# Patient Record
Sex: FEMALE | Race: WHITE | NOT HISPANIC OR LATINO | Employment: FULL TIME | URBAN - METROPOLITAN AREA
[De-identification: names, ages, dates, MRNs, and addresses within clinical notes are randomized per-mention and may not be internally consistent; named-entity substitution may affect disease eponyms.]

---

## 2024-03-29 ENCOUNTER — OFFICE VISIT (OUTPATIENT)
Dept: FAMILY MEDICINE CLINIC | Facility: CLINIC | Age: 44
End: 2024-03-29
Payer: COMMERCIAL

## 2024-03-29 VITALS
SYSTOLIC BLOOD PRESSURE: 147 MMHG | HEIGHT: 65 IN | TEMPERATURE: 97.2 F | BODY MASS INDEX: 47.32 KG/M2 | HEART RATE: 89 BPM | OXYGEN SATURATION: 98 % | WEIGHT: 284 LBS | DIASTOLIC BLOOD PRESSURE: 89 MMHG

## 2024-03-29 DIAGNOSIS — D50.0 IRON DEFICIENCY ANEMIA DUE TO CHRONIC BLOOD LOSS: Chronic | ICD-10-CM

## 2024-03-29 DIAGNOSIS — Z76.89 ENCOUNTER FOR WEIGHT MANAGEMENT: Primary | ICD-10-CM

## 2024-03-29 DIAGNOSIS — F51.04 PSYCHOPHYSIOLOGICAL INSOMNIA: ICD-10-CM

## 2024-03-29 DIAGNOSIS — R73.03 PREDIABETES: Chronic | ICD-10-CM

## 2024-03-29 DIAGNOSIS — E66.01 CLASS 3 SEVERE OBESITY WITHOUT SERIOUS COMORBIDITY WITH BODY MASS INDEX (BMI) OF 45.0 TO 49.9 IN ADULT, UNSPECIFIED OBESITY TYPE: Chronic | ICD-10-CM

## 2024-03-29 RX ORDER — METFORMIN HYDROCHLORIDE 500 MG/1
500 TABLET, EXTENDED RELEASE ORAL
Qty: 90 TABLET | Refills: 0 | Status: SHIPPED | OUTPATIENT
Start: 2024-03-29

## 2024-03-29 NOTE — PROGRESS NOTES
"Chief Complaint  Weight Loss (2 month follow up )    Subjective        Jailyn IRWIN presents to [facility]  History of Present Illness    History of Present Illness  The patient is a 44-year-old female who is here for follow-up on weight loss.    The 7.5 mg Zepbound is not helping her and she is staying hungry. She has been on the 7.5 mg for a month and would like to go up on the dose. She is not having any trouble with the compound pharmacy. She feels like her weight is slowly dropping. The week before her period, she gets up in the middle of the night to eat. She has been exercising and has lost some weight. She does not check her blood pressure. She has been stressed out for the past week. Her shoulders are aching. She is not consistent with taking her iron. She has noticed that she is starting to bruise easily. She denies any constipation, nausea, or vomiting. When she was on weight loss medication, her mouth gets dry and she gets blisters around her mouth. She has never been on metformin.    She has not been sleeping. She has been having bad night sweats. She gets 3 to 4 hours of sleep. She does not want to be on a sleeping pill. She plays on her phone until she is tired-aware this is not good. She has anxiety.         Objective   Vital Signs:  /89 (BP Location: Left arm, Patient Position: Sitting, Cuff Size: Large Adult)   Pulse 89   Temp 97.2 °F (36.2 °C)   Ht 165.1 cm (65\")   Wt 129 kg (284 lb)   SpO2 98%   BMI 47.26 kg/m²   Estimated body mass index is 47.26 kg/m² as calculated from the following:    Height as of this encounter: 165.1 cm (65\").    Weight as of this encounter: 129 kg (284 lb).               Physical Exam  Vitals and nursing note reviewed.   Constitutional:       General: She is not in acute distress.     Appearance: She is well-developed. She is obese. She is not ill-appearing or diaphoretic.   HENT:      Head: Normocephalic and atraumatic.   Eyes: "      General:         Right eye: No discharge.         Left eye: No discharge.      Conjunctiva/sclera: Conjunctivae normal.   Cardiovascular:      Rate and Rhythm: Normal rate and regular rhythm.   Pulmonary:      Effort: Pulmonary effort is normal.      Breath sounds: Normal breath sounds.   Abdominal:      General: Bowel sounds are normal. There is no distension.      Palpations: Abdomen is soft.   Musculoskeletal:         General: No deformity.      Comments: Gait smooth and steady   Skin:     General: Skin is warm and dry.   Neurological:      General: No focal deficit present.      Mental Status: She is alert and oriented to person, place, and time.   Psychiatric:         Mood and Affect: Mood normal.         Behavior: Behavior normal.         Thought Content: Thought content normal.          Physical Exam       Result Review :            Results                  Assessment and Plan     Diagnoses and all orders for this visit:    1. Encounter for weight management (Primary)    2. Class 3 severe obesity without serious comorbidity with body mass index (BMI) of 45.0 to 49.9 in adult, unspecified obesity type  -     Tirzepatide-Weight Management (ZEPBOUND) 10 MG/0.5ML solution auto-injector; Inject 0.5 mL under the skin into the appropriate area as directed 1 (One) Time Per Week.  Dispense: 3 mL; Refill: 0  -     metFORMIN ER (GLUCOPHAGE-XR) 500 MG 24 hr tablet; Take 1 tablet by mouth Daily With Breakfast.  Dispense: 90 tablet; Refill: 0    3. Prediabetes  -     metFORMIN ER (GLUCOPHAGE-XR) 500 MG 24 hr tablet; Take 1 tablet by mouth Daily With Breakfast.  Dispense: 90 tablet; Refill: 0    4. Iron deficiency anemia due to chronic blood loss    5. Psychophysiological insomnia        Assessment & Plan  1.  Obesity  She has lost 5 pounds since last visit in January. I will increase her Zepbound to 10 mg.  We will add metformin 500 mg once a day to see if this can help with likely insulin resistance which may improve  her fatigue.  May help spark some weight loss.  She will not start new dose of Zepbound at the same time to avoid side effects.  Side effects of metformin and dietary modifications with increased dose of Zepbound discussed.  She has had previous noted prediabetes.    2. Easy bruising.  She was advised to take vitamin C with her iron.  I do not see any unusual bruising.    3. Insomnia  This could be due to anxiety.  She is on Cymbalta currently for fibromyalgia.  We could look at adding on additional medication if needed but at this point I think we should try sleep hygiene which we discussed.    4.  Iron deficiency anemia  She will continue current iron, add vitamin C to help absorption and bruising and will reevaluate next appointment.    Follow-up  She will follow up as needed.            Follow Up     Return in about 3 months (around 6/29/2024).  Patient was given instructions and counseling regarding her condition or for health maintenance advice. Please see specific information pulled into the AVS if appropriate.    Patient or patient representative verbalized consent for the use of Ambient Listening during the visit with  MATEO Condon for chart documentation. 4/5/2024  06:52 EDT

## 2024-06-20 DIAGNOSIS — M79.7 FIBROMYALGIA: Chronic | ICD-10-CM

## 2024-06-20 RX ORDER — DULOXETIN HYDROCHLORIDE 60 MG/1
60 CAPSULE, DELAYED RELEASE ORAL DAILY
Qty: 90 CAPSULE | Refills: 1 | Status: SHIPPED | OUTPATIENT
Start: 2024-06-20

## 2024-06-20 NOTE — TELEPHONE ENCOUNTER
Rx Refill Note  Requested Prescriptions     Pending Prescriptions Disp Refills    DULoxetine (CYMBALTA) 60 MG capsule [Pharmacy Med Name: DULoxetine HCL DR 60 MG CAPSULE] 90 capsule 1     Sig: TAKE 1 CAPSULE BY MOUTH DAILY      Last office visit with prescribing clinician: 3/29/2024   Last telemedicine visit with prescribing clinician: Visit date not found   Next office visit with prescribing clinician: Visit date not found                         Would you like a call back once the refill request has been completed: [] Yes [] No    If the office needs to give you a call back, can they leave a voicemail: [] Yes [] No    Sam Yan Rep  06/20/24, 08:44 EDT

## 2024-06-24 DIAGNOSIS — R73.03 PREDIABETES: Chronic | ICD-10-CM

## 2024-06-24 DIAGNOSIS — E66.01 CLASS 3 SEVERE OBESITY WITHOUT SERIOUS COMORBIDITY WITH BODY MASS INDEX (BMI) OF 45.0 TO 49.9 IN ADULT, UNSPECIFIED OBESITY TYPE: Chronic | ICD-10-CM

## 2024-06-24 RX ORDER — METFORMIN HYDROCHLORIDE 500 MG/1
500 TABLET, EXTENDED RELEASE ORAL
Qty: 90 TABLET | Refills: 1 | Status: SHIPPED | OUTPATIENT
Start: 2024-06-24

## 2024-06-24 NOTE — TELEPHONE ENCOUNTER
Rx Refill Note  Requested Prescriptions     Pending Prescriptions Disp Refills    metFORMIN ER (GLUCOPHAGE-XR) 500 MG 24 hr tablet [Pharmacy Med Name: METFORMIN HCL  MG TABLET] 90 tablet 1     Sig: TAKE 1 TABLET BY MOUTH DAILY WITH BREAKFAST      Last office visit with prescribing clinician: 3/29/2024   Last telemedicine visit with prescribing clinician: Visit date not found   Next office visit with prescribing clinician: Visit date not found                         Would you like a call back once the refill request has been completed: [] Yes [] No    If the office needs to give you a call back, can they leave a voicemail: [] Yes [] No    Sam Yan Rep  06/24/24, 09:08 EDT

## 2024-08-02 ENCOUNTER — OFFICE VISIT (OUTPATIENT)
Dept: FAMILY MEDICINE CLINIC | Facility: CLINIC | Age: 44
End: 2024-08-02
Payer: COMMERCIAL

## 2024-08-02 VITALS
HEART RATE: 64 BPM | WEIGHT: 287.4 LBS | BODY MASS INDEX: 47.88 KG/M2 | SYSTOLIC BLOOD PRESSURE: 125 MMHG | HEIGHT: 65 IN | TEMPERATURE: 97 F | RESPIRATION RATE: 14 BRPM | DIASTOLIC BLOOD PRESSURE: 75 MMHG | OXYGEN SATURATION: 99 %

## 2024-08-02 DIAGNOSIS — E66.01 CLASS 3 SEVERE OBESITY WITHOUT SERIOUS COMORBIDITY WITH BODY MASS INDEX (BMI) OF 45.0 TO 49.9 IN ADULT, UNSPECIFIED OBESITY TYPE: Primary | ICD-10-CM

## 2024-08-02 DIAGNOSIS — Z12.31 ENCOUNTER FOR SCREENING MAMMOGRAM FOR MALIGNANT NEOPLASM OF BREAST: ICD-10-CM

## 2024-08-02 DIAGNOSIS — R73.03 PREDIABETES: ICD-10-CM

## 2024-08-02 PROCEDURE — 99214 OFFICE O/P EST MOD 30 MIN: CPT | Performed by: NURSE PRACTITIONER

## 2024-08-02 RX ORDER — SEMAGLUTIDE 0.5 MG/.5ML
0.5 INJECTION, SOLUTION SUBCUTANEOUS WEEKLY
Qty: 2 ML | Refills: 0 | Status: SHIPPED | OUTPATIENT
Start: 2024-08-02

## 2024-08-02 NOTE — PROGRESS NOTES
"Chief Complaint  Med Management    Subjective        Jailyn IRWIN presents to [facility]  History of Present Illness    History of Present Illness  The patient is a 43-year-old female who presents for a follow-up.    The patient discontinued Zepbound approximately one month ago due to financial constraints, prompting her interest in transitioning to semaglutide. Despite being on Zepbound, she did not observe any weight loss, although it did suppress her appetite. She reported a decrease in weight, with her lowest recorded weight being 275 pounds last month, followed by a weight gain of 4 to 7 pounds. She was previously prescribed Semaglutide and phentermine by another physician for appetite suppression. Her daily protein intake is up to 100 g. Caloric intake has now decreased to 1900 calories. She was also prescribed Topamax, which resulted in weight loss, but resulted in excessive thirst. She reports poor sleep quality, although her snoring has improved. She denies any gastrointestinal symptoms such as nausea, vomiting, diarrhea, or constipation. She also denies any ear or throat issues. She has considered weight loss surgery.       Objective   Vital Signs:  /75   Pulse 64   Temp 97 °F (36.1 °C) (Temporal)   Resp 14   Ht 165.1 cm (65\")   Wt 130 kg (287 lb 6.4 oz)   SpO2 99%   BMI 47.83 kg/m²   Estimated body mass index is 47.83 kg/m² as calculated from the following:    Height as of this encounter: 165.1 cm (65\").    Weight as of this encounter: 130 kg (287 lb 6.4 oz).               Physical Exam  Vitals and nursing note reviewed.   Constitutional:       General: She is not in acute distress.     Appearance: She is well-developed. She is obese. She is not ill-appearing or diaphoretic.   HENT:      Head: Normocephalic and atraumatic.   Eyes:      General:         Right eye: No discharge.         Left eye: No discharge.      Conjunctiva/sclera: Conjunctivae normal. "   Cardiovascular:      Rate and Rhythm: Normal rate and regular rhythm.   Pulmonary:      Effort: Pulmonary effort is normal.      Breath sounds: Normal breath sounds.   Abdominal:      General: Bowel sounds are normal. There is no distension.      Palpations: Abdomen is soft.      Tenderness: There is no abdominal tenderness.   Musculoskeletal:         General: No deformity.      Comments: Gait smooth and steady   Skin:     General: Skin is warm and dry.   Neurological:      General: No focal deficit present.      Mental Status: She is alert and oriented to person, place, and time.   Psychiatric:         Mood and Affect: Mood normal.         Behavior: Behavior normal.          Physical Exam       Result Review :            Results                  Assessment and Plan     Diagnoses and all orders for this visit:    1. Class 3 severe obesity without serious comorbidity with body mass index (BMI) of 45.0 to 49.9 in adult, unspecified obesity type (Primary)  -     Comprehensive Metabolic Panel  -     CBC (No Diff)  -     TSH Rfx On Abnormal To Free T4  -     metFORMIN (GLUCOPHAGE) 500 MG tablet; Take 1 tablet by mouth 2 (Two) Times a Day With Meals.  Dispense: 180 tablet; Refill: 1  -     Semaglutide-Weight Management (Wegovy) 0.5 MG/0.5ML solution auto-injector; Inject 0.5 mL under the skin into the appropriate area as directed 1 (One) Time Per Week.  Dispense: 2 mL; Refill: 0    2. Prediabetes  -     Hemoglobin A1c  -     metFORMIN (GLUCOPHAGE) 500 MG tablet; Take 1 tablet by mouth 2 (Two) Times a Day With Meals.  Dispense: 180 tablet; Refill: 1    3. Encounter for screening mammogram for malignant neoplasm of breast  -     Mammo screening digital tomosynthesis bilateral w CAD; Future        Assessment & Plan    The patient's blood pressure readings are within the normal range. A prescription for semaglutide 0.5 mg has been issued, with instructions to inform us on the 3-week rula regarding the dosage. Metformin has  been refilled, to be taken twice daily. Blood work, including A1c, kidney function, and thyroid function tests, will be conducted today. A mammogram will be ordered. The patient has been advised to adhere to a high-protein diet of 100 g daily and to incorporate muscle building into her exercise routine.    Follow-up  A follow-up visit is scheduled for 3 months from now.            Follow Up     Return in about 3 months (around 11/2/2024) for Annual physical.  Patient was given instructions and counseling regarding her condition or for health maintenance advice. Please see specific information pulled into the AVS if appropriate.    Patient or patient representative verbalized consent for the use of Ambient Listening during the visit with  MATEO Condon for chart documentation. 8/6/2024  07:37 EDT

## 2024-08-03 LAB
ALBUMIN SERPL-MCNC: 4.1 G/DL (ref 3.5–5.2)
ALBUMIN/GLOB SERPL: 1.6 G/DL
ALP SERPL-CCNC: 74 U/L (ref 39–117)
ALT SERPL-CCNC: 15 U/L (ref 1–33)
AST SERPL-CCNC: 14 U/L (ref 1–32)
BILIRUB SERPL-MCNC: 0.2 MG/DL (ref 0–1.2)
BUN SERPL-MCNC: 13 MG/DL (ref 6–20)
BUN/CREAT SERPL: 15.7 (ref 7–25)
CALCIUM SERPL-MCNC: 9.2 MG/DL (ref 8.6–10.5)
CHLORIDE SERPL-SCNC: 104 MMOL/L (ref 98–107)
CO2 SERPL-SCNC: 22.7 MMOL/L (ref 22–29)
CREAT SERPL-MCNC: 0.83 MG/DL (ref 0.57–1)
EGFRCR SERPLBLD CKD-EPI 2021: 89.8 ML/MIN/1.73
ERYTHROCYTE [DISTWIDTH] IN BLOOD BY AUTOMATED COUNT: 17 % (ref 12.3–15.4)
GLOBULIN SER CALC-MCNC: 2.5 GM/DL
GLUCOSE SERPL-MCNC: 92 MG/DL (ref 65–99)
HBA1C MFR BLD: 5.6 % (ref 4.8–5.6)
HCT VFR BLD AUTO: 34 % (ref 34–46.6)
HGB BLD-MCNC: 9.8 G/DL (ref 12–15.9)
MCH RBC QN AUTO: 20.9 PG (ref 26.6–33)
MCHC RBC AUTO-ENTMCNC: 28.8 G/DL (ref 31.5–35.7)
MCV RBC AUTO: 72.3 FL (ref 79–97)
PLATELET # BLD AUTO: 441 10*3/MM3 (ref 140–450)
POTASSIUM SERPL-SCNC: 4.2 MMOL/L (ref 3.5–5.2)
PROT SERPL-MCNC: 6.6 G/DL (ref 6–8.5)
RBC # BLD AUTO: 4.7 10*6/MM3 (ref 3.77–5.28)
SODIUM SERPL-SCNC: 138 MMOL/L (ref 136–145)
TSH SERPL DL<=0.005 MIU/L-ACNC: 2.49 UIU/ML (ref 0.27–4.2)
WBC # BLD AUTO: 5.44 10*3/MM3 (ref 3.4–10.8)

## 2024-08-12 ENCOUNTER — TELEMEDICINE (OUTPATIENT)
Dept: FAMILY MEDICINE CLINIC | Facility: CLINIC | Age: 44
End: 2024-08-12
Payer: COMMERCIAL

## 2024-08-12 DIAGNOSIS — D50.0 IRON DEFICIENCY ANEMIA DUE TO CHRONIC BLOOD LOSS: Primary | ICD-10-CM

## 2024-08-12 PROCEDURE — 99213 OFFICE O/P EST LOW 20 MIN: CPT | Performed by: NURSE PRACTITIONER

## 2024-08-12 NOTE — PROGRESS NOTES
"Chief Complaint  Results    Subjective        Jailyn IRWIN presents to [facility]  History of Present Illness    History of Present Illness  Pt scheduled a video visit to discuss recent anemia on labs.  Had been normal 10 months ago and now 9.8.  Since labs have come back, she has started Fe 65 mg BID and tolerating without problems. She has not started vitamin c to help absorption, but plans to do so.  She eats foods high in iron and does not limit foods since starting Semaglutide.  She has noticed she is very fatigued and has a hard time staying awake recently.  She denies dizziness, heart palpitations.      Has had intermittent anemia for many yrs. gay when pregnant.  She has very heavy monthly periods.  Bleeds heavily the first 3 days, requires super tampon, pad and changes hourly.  Then improves and has a large \"gusher\" end of period. She was given tranexamic acid to reduce period flow by GYN and took for couple months and did not change flow. She has not followed up since.      Denies melena, hematochezia.  No abd pain, n/v, epigastric pain.        Objective   Vital Signs:  There were no vitals taken for this visit.  Estimated body mass index is 47.83 kg/m² as calculated from the following:    Height as of 8/2/24: 165.1 cm (65\").    Weight as of 8/2/24: 130 kg (287 lb 6.4 oz).               Physical Exam     Physical Exam   Limited by video visit.  She is well appearing and does not seem to be distressed.  She seems alert and oriented and her mood and affect are normal, good historian of medical history.  No cough or dyspnea appreciated, able to complete sentences without problem.       Result Review :            Results                  Assessment and Plan     Diagnoses and all orders for this visit:    1. Iron deficiency anemia due to chronic blood loss (Primary)        Assessment & Plan  Iron deficiency anemia due to chronic heavy periods.  Pt has upcoming appt with GYN and " will discuss.  She is currently taking iron without side effects.  Plans to let me know what next steps with GYN are after she sees in September.  Will plan to recheck labs in appr 3 months.  She will add vitamin C with Fe.  She is also eating diet high in Fe and tolerating GLP-1 without side effects.  She will stop BID dosing Fe in favor of QD dosing in 2 weeks.             I spent 15 minutes caring for Jailyn on this date of service. This time includes time spent by me in the following activities:preparing for the visit, reviewing tests, performing a medically appropriate examination and/or evaluation , counseling and educating the patient/family/caregiver, and documenting information in the medical record  Follow Up     No follow-ups on file.  Patient was given instructions and counseling regarding her condition or for health maintenance advice. Please see specific information pulled into the AVS if appropriate.

## 2024-11-15 ENCOUNTER — OFFICE VISIT (OUTPATIENT)
Dept: FAMILY MEDICINE CLINIC | Facility: CLINIC | Age: 44
End: 2024-11-15
Payer: COMMERCIAL

## 2024-11-15 VITALS
DIASTOLIC BLOOD PRESSURE: 82 MMHG | HEIGHT: 65 IN | OXYGEN SATURATION: 97 % | BODY MASS INDEX: 48.82 KG/M2 | HEART RATE: 77 BPM | TEMPERATURE: 97 F | RESPIRATION RATE: 14 BRPM | WEIGHT: 293 LBS | SYSTOLIC BLOOD PRESSURE: 148 MMHG

## 2024-11-15 DIAGNOSIS — E66.01 CLASS 3 SEVERE OBESITY WITH SERIOUS COMORBIDITY AND BODY MASS INDEX (BMI) OF 45.0 TO 49.9 IN ADULT, UNSPECIFIED OBESITY TYPE: ICD-10-CM

## 2024-11-15 DIAGNOSIS — D50.0 IRON DEFICIENCY ANEMIA DUE TO CHRONIC BLOOD LOSS: ICD-10-CM

## 2024-11-15 DIAGNOSIS — Z00.00 ROUTINE GENERAL MEDICAL EXAMINATION AT A HEALTH CARE FACILITY: Primary | ICD-10-CM

## 2024-11-15 DIAGNOSIS — F34.1 DYSTHYMIA: ICD-10-CM

## 2024-11-15 DIAGNOSIS — M79.7 FIBROMYALGIA: Chronic | ICD-10-CM

## 2024-11-15 DIAGNOSIS — E66.813 CLASS 3 SEVERE OBESITY WITH SERIOUS COMORBIDITY AND BODY MASS INDEX (BMI) OF 45.0 TO 49.9 IN ADULT, UNSPECIFIED OBESITY TYPE: ICD-10-CM

## 2024-11-15 DIAGNOSIS — R53.83 FATIGUE, UNSPECIFIED TYPE: ICD-10-CM

## 2024-11-15 DIAGNOSIS — L30.4 INTERTRIGO: ICD-10-CM

## 2024-11-15 RX ORDER — DULOXETIN HYDROCHLORIDE 60 MG/1
60 CAPSULE, DELAYED RELEASE ORAL DAILY
Qty: 90 CAPSULE | Refills: 1 | Status: SHIPPED | OUTPATIENT
Start: 2024-11-15

## 2024-11-15 RX ORDER — CLOTRIMAZOLE AND BETAMETHASONE DIPROPIONATE 10; .64 MG/G; MG/G
1 CREAM TOPICAL 2 TIMES DAILY
Qty: 45 G | Refills: 2 | Status: SHIPPED | OUTPATIENT
Start: 2024-11-15

## 2024-11-15 NOTE — PROGRESS NOTES
Chief Complaint  Annual Exam    Subjective        Jailyn IRWIN presents to [facility]  History of Present Illness    History of Present Illness  The patient is here for a physical.    She reports that her recent blood work indicates her body is absorbing iron, but she is losing it due to heavy periods.  She has seen GYN and has planned D&C with possible IUD placement and would like further information on IUD.  Worried that it would cause weight gain.    She has gained approximately 10 pounds and is considering increasing her semaglutide dosage. She is due for a refill of this medication and reports no side effects from it.  She has started process for bariatric surgery.  She is considering gastric sleeve surgery. She needs monthly visit for diet checks prior.    She admits to not taking her metformin as prescribed due to a dislike of pills. She had previously stopped taking Cymbalta but has since resumed it. She reports that Cymbalta helps control her appetite and improves her mood.  She denies side effects.    She has been experiencing numbness and a cold sensation in her feet for about a week and a half-across the top of her feet which occurs when she is sitting at work. She has a walking treadmill at home and does not experience significant swelling in her feet. She wears slippers when working and works from home.      She has not had a colonoscopy and is unsure about her vaccination status. She has not been tested for sleep apnea, although previously referred. She reports no shortness of breath or cough. She experienced a racing heart rate a few weeks ago, which she was able to calm down.  Heart rate was just over 100 and not sure if it was associated with anxiety.  Not had it again.  No associated chest pain or other symptoms.    She has a lot of ear wax, which affects her hearing when she lies down. She regularly visits the dentist and reports no difficulty swallowing or acid  "reflux. She reports no nausea, vomiting, diarrhea, constipation, or changes in stool characteristics. She reports no muscle aches or pains.    She has stopped going to the gym but plans to resume. She is trying to reduce her sugar intake and drinks flavored water.  She reports that she thinks she is intaking too little calories.  However does not count calories but does feel like her intake is low.  Yesterday had sausage pancake sandwich for breakfast, being [employer] from [employer], and for dinner ate homemade cheddar broccoli soup and grilled cheese sandwich.     FLORESITA-7 Score: FLORESITA 7 Total Score: 0  PHQ-9 Total Score: 0    Objective   Vital Signs:  /82   Pulse 77   Temp 97 °F (36.1 °C) (Infrared)   Resp 14   Ht 165.1 cm (65\")   Wt 133 kg (294 lb)   SpO2 97%   BMI 48.92 kg/m²   Estimated body mass index is 48.92 kg/m² as calculated from the following:    Height as of this encounter: 165.1 cm (65\").    Weight as of this encounter: 133 kg (294 lb).               Physical Exam  Vitals and nursing note reviewed.   Constitutional:       General: She is not in acute distress.     Appearance: She is well-developed. She is obese. She is not ill-appearing.   HENT:      Head: Normocephalic and atraumatic.      Right Ear: Ear canal and external ear normal. There is impacted cerumen.      Left Ear: Tympanic membrane, ear canal and external ear normal.      Mouth/Throat:      Mouth: Mucous membranes are moist.      Pharynx: Uvula midline. No posterior oropharyngeal erythema.   Eyes:      General: No scleral icterus.        Right eye: No discharge.         Left eye: No discharge.      Conjunctiva/sclera: Conjunctivae normal.      Pupils: Pupils are equal, round, and reactive to light.   Neck:      Thyroid: No thyromegaly.   Cardiovascular:      Rate and Rhythm: Normal rate and regular rhythm.      Pulses:           Dorsalis pedis pulses are 2+ on the right side and 2+ on the left side.      Heart sounds: Normal heart " sounds. No murmur heard.  Pulmonary:      Effort: Pulmonary effort is normal.      Breath sounds: Normal breath sounds.   Abdominal:      General: Bowel sounds are normal. There is no distension.      Palpations: Abdomen is soft. There is no mass.      Tenderness: There is no abdominal tenderness. There is no guarding or rebound.      Hernia: No hernia is present.   Musculoskeletal:         General: No deformity.      Cervical back: Neck supple.      Right lower leg: Edema (Trace around ankle) present.      Left lower leg: Edema (Trace, greater than right, around ankle and foot) present.      Right foot: No deformity.      Left foot: No deformity.      Comments: Gait smooth and steady   Feet:      Right foot:      Skin integrity: Skin integrity normal.      Toenail Condition: Right toenails are normal.      Left foot:      Skin integrity: Skin integrity normal.      Toenail Condition: Left toenails are normal.      Comments: Both feet are warm and dry with good cap refill  Lymphadenopathy:      Cervical: No cervical adenopathy.   Skin:     General: Skin is warm and dry.   Neurological:      General: No focal deficit present.      Mental Status: She is alert and oriented to person, place, and time.   Psychiatric:         Mood and Affect: Mood normal.         Behavior: Behavior normal.         Thought Content: Thought content normal.      Comments: Very pleasant, conversant, engaged          Physical Exam  Vital Signs  Blood pressure reading is 125/75.     Result Review :            Results  Laboratory Studies  Iron levels are low but slowly increasing. B12 levels were below 500 in October.                Assessment and Plan     Diagnoses and all orders for this visit:    1. Routine general medical examination at a health care facility (Primary)    2. Class 3 severe obesity with serious comorbidity and body mass index (BMI) of 45.0 to 49.9 in adult, unspecified obesity type  -     Semaglutide-Weight Management 1.7  MG/0.75ML solution auto-injector; Inject 0.75 mL under the skin into the appropriate area as directed 1 (One) Time Per Week. May compound with B12  Dispense: 3 mL; Refill: 2  -     Ambulatory Referral to Sleep Medicine    3. Intertrigo  -     clotrimazole-betamethasone (LOTRISONE) 1-0.05 % cream; Apply 1 Application topically to the appropriate area as directed 2 (Two) Times a Day.  Dispense: 45 g; Refill: 2    4. Iron deficiency anemia due to chronic blood loss    5. Fatigue, unspecified type    6. Dysthymia  -     DULoxetine (CYMBALTA) 60 MG capsule; Take 1 capsule by mouth Daily.  Dispense: 90 capsule; Refill: 1    7. Fibromyalgia  -     DULoxetine (CYMBALTA) 60 MG capsule; Take 1 capsule by mouth Daily.  Dispense: 90 capsule; Refill: 1        Assessment & Plan  1. Anemia-chronic-secondary to chronic blood loss from AUB  Her iron levels are gradually increasing, indicating a positive response to the current treatment plan. She is advised to continue with her current medications. Increasing her intake of vitamin B complex is recommended.     2. Obesity-worsening  Her weight gain is likely due to increased caloric intake, possibly driven by her anemia. The dosage of semaglutide will be increased to 1.7 mg. She plans to adopt a calorie deficit diet, avoid fast food, and incorporate resistant band exercises into her routine. She is encouraged to increase her protein intake to 100 g per day and incorporate more vegetables into her diet.  Add movement daily that she enjoys.    3. Pain in both feet-knee problem  She reports numbness and cold feeling in both feet, which occurs while sitting and has been ongoing for about a week and a half. This may be related to circulation issues. She is recommended to use compression stockings and ensure she gets up and walks around regularly to improve circulation.  Make sure to take her B complex daily additionally.    4. Medication Management.  She is advised to continue taking  Cymbalta as it helps with her mood, fibromyalgia and reduces hunger. She has not been taking metformin as prescribed and is advised to take it if she feels it helps.    5.  Intertrigo-problem  A combination of steroid and antifungal cream will be prescribed-aware of management and prevention    6. Health Maintenance.  Her A1c levels are within the normal range. Her blood pressure was stable at 125/75 during her last gynecologist visit in August. She had a mammogram last week. A sleep study will be conducted for suspected AILEEN.  She will need this done prior to any bariatric surgery.  Discussed it should also help fatigue which I think is driving some of her hunger and eating choices in combo with her anemia.  Health benefits discussed.    Appropriate health maintenance and prevention topics specific for this patient were discussed today.  Additionally, health goals, and health concerns addressed as appropriate.  Pt was encouraged to stay up to date on recommended screenings and vaccines based on USPSTF guidelines.              I spent 50 minutes caring for Jailyn on this date of service. This time includes time spent by me in the following activities:preparing for the visit, reviewing tests, performing a medically appropriate examination and/or evaluation , counseling and educating the patient/family/caregiver, ordering medications, tests, or procedures, and documenting information in the medical record  Follow Up     No follow-ups on file.  Patient was given instructions and counseling regarding her condition or for health maintenance advice. Please see specific information pulled into the AVS if appropriate.    Patient or patient representative verbalized consent for the use of Ambient Listening during the visit with  MATEO Condon for chart documentation. 11/15/2024  18:18 EST

## 2024-12-05 ENCOUNTER — PATIENT MESSAGE (OUTPATIENT)
Dept: FAMILY MEDICINE CLINIC | Facility: CLINIC | Age: 44
End: 2024-12-05
Payer: COMMERCIAL

## 2024-12-05 DIAGNOSIS — E66.01 CLASS 3 SEVERE OBESITY WITH SERIOUS COMORBIDITY AND BODY MASS INDEX (BMI) OF 45.0 TO 49.9 IN ADULT, UNSPECIFIED OBESITY TYPE: ICD-10-CM

## 2024-12-05 DIAGNOSIS — E66.813 CLASS 3 SEVERE OBESITY WITH SERIOUS COMORBIDITY AND BODY MASS INDEX (BMI) OF 45.0 TO 49.9 IN ADULT, UNSPECIFIED OBESITY TYPE: ICD-10-CM

## 2024-12-16 ENCOUNTER — OFFICE VISIT (OUTPATIENT)
Dept: FAMILY MEDICINE CLINIC | Facility: CLINIC | Age: 44
End: 2024-12-16
Payer: COMMERCIAL

## 2024-12-16 VITALS
OXYGEN SATURATION: 99 % | SYSTOLIC BLOOD PRESSURE: 124 MMHG | DIASTOLIC BLOOD PRESSURE: 78 MMHG | HEART RATE: 64 BPM | WEIGHT: 293 LBS | HEIGHT: 65 IN | TEMPERATURE: 97.8 F | BODY MASS INDEX: 48.82 KG/M2

## 2024-12-16 DIAGNOSIS — E66.813 CLASS 3 SEVERE OBESITY WITH BODY MASS INDEX (BMI) OF 50.0 TO 59.9 IN ADULT, UNSPECIFIED OBESITY TYPE, UNSPECIFIED WHETHER SERIOUS COMORBIDITY PRESENT: ICD-10-CM

## 2024-12-16 DIAGNOSIS — E66.01 CLASS 3 SEVERE OBESITY WITH BODY MASS INDEX (BMI) OF 50.0 TO 59.9 IN ADULT, UNSPECIFIED OBESITY TYPE, UNSPECIFIED WHETHER SERIOUS COMORBIDITY PRESENT: ICD-10-CM

## 2024-12-16 DIAGNOSIS — R73.03 PREDIABETES: ICD-10-CM

## 2024-12-16 DIAGNOSIS — Z76.89 ENCOUNTER FOR WEIGHT MANAGEMENT: Primary | ICD-10-CM

## 2024-12-16 DIAGNOSIS — R53.83 FATIGUE, UNSPECIFIED TYPE: ICD-10-CM

## 2024-12-16 PROCEDURE — 99213 OFFICE O/P EST LOW 20 MIN: CPT | Performed by: NURSE PRACTITIONER

## 2024-12-16 NOTE — PROGRESS NOTES
Chief Complaint  Weight Loss (Has not noticed any changes.)    Subjective        Jailyn IRWIN presents to [facility]  History of Present Illness    History of Present Illness  The patient presents for weight management for obesity--possible bariatric surgery.    She has been unable to obtain semaglutide due to a change in the pharmacy's compounding policy, which now excludes B12. She reports weight gain despite dietary modifications, including increased water intake and reduced soda consumption. She is struggling with protein intake but is working on it. She recently got some protein brownies that have 19 g of protein in them. She has not been keeping track of her protein intake. She has been using an garrison to track her calories, protein but stopped doing that last month. She has not been eating out a lot. She is maintaining hydration by consuming half her body weight in ounces of water daily. She has not been exercising but plans to start after Alexandre. Has treadmill but is too tired after work. She has a treadmill at home and a walking pad for her desk-got them last week and not yet started using. She does have time between bus for daughter and leaving for work when she could exercise. She does not experience back pain. Her lowest weight was 273 pounds, but she could never stay at 273 pounds. She was always between 275 and 285 pounds, and now she is 300 pounds again.  Feeling discouraged with this.     She has been experiencing hot flashes at night and occasionally during the day, a new symptom as she typically feels cold. She attributes this to her low iron levels. She reports no chest pain or palpitations.    Many friends have been on GLP-1 or had bariatric surgery so holiday parties have not been as much of a problem causing over eating.      Supplemental Information  She had hysteroscopy D&C with Mirena placement and it was found that there was no polyp. She has been dealing  "with bloating and has not felt great since which has hindered exercise.        Objective   Vital Signs:  /78 (BP Location: Left arm, Patient Position: Sitting, Cuff Size: Large Adult)   Pulse 64   Temp 97.8 °F (36.6 °C) (Temporal)   Ht 165.1 cm (65\")   Wt (!) 137 kg (301 lb 3.2 oz)   SpO2 99%   BMI 50.12 kg/m²   Estimated body mass index is 50.12 kg/m² as calculated from the following:    Height as of this encounter: 165.1 cm (65\").    Weight as of this encounter: 137 kg (301 lb 3.2 oz).               Physical Exam  Vitals and nursing note reviewed.   Constitutional:       General: She is not in acute distress.     Appearance: She is well-developed. She is obese. She is not ill-appearing or diaphoretic.   HENT:      Head: Normocephalic and atraumatic.   Eyes:      General:         Right eye: No discharge.         Left eye: No discharge.      Conjunctiva/sclera: Conjunctivae normal.   Cardiovascular:      Rate and Rhythm: Normal rate and regular rhythm.   Pulmonary:      Effort: Pulmonary effort is normal.      Breath sounds: Normal breath sounds.   Abdominal:      General: Bowel sounds are normal.      Palpations: Abdomen is soft.      Tenderness: There is no abdominal tenderness.   Musculoskeletal:         General: No deformity.      Comments: Gait smooth and steady   Skin:     General: Skin is warm and dry.   Neurological:      Mental Status: She is alert and oriented to person, place, and time.   Psychiatric:         Mood and Affect: Mood normal.         Behavior: Behavior normal.          Physical Exam       Result Review :            Results                  Assessment and Plan     Diagnoses and all orders for this visit:    1. Encounter for weight management (Primary)    2. Class 3 severe obesity with body mass index (BMI) of 50.0 to 59.9 in adult, unspecified obesity type, unspecified whether serious comorbidity present    3. Prediabetes    4. Fatigue, unspecified type        Assessment & Plan  1. " Potential candidate for bariatric surgery.  She has been advised to monitor her protein intake and maintain adequate hydration. The importance of regular exercise was emphasized, with a recommendation to engage in physical activity for at least 15 minutes daily in am. She was also counseled on the benefits of consuming salads prior to meals to aid in portion control. She will contact the pharmacy regarding her semaglutide prescription-it was sent at beginning of month. Importance of tracking food and gay protein discussed.  She would be a good candidate for bariatric surgery.  We discussed this and encouraged patient to do consult.    2.  Continue current metformin and semaglutide for prediabetes.  Will monitor A1c.    3.  Fatigue-probably multifactorial with mood, changes in diet.  Monitor.  Discussed management.                  Follow Up     No follow-ups on file.  Patient was given instructions and counseling regarding her condition or for health maintenance advice. Please see specific information pulled into the AVS if appropriate.    Patient or patient representative verbalized consent for the use of Ambient Listening during the visit with  MATEO Condon for chart documentation. 12/16/2024  11:14 EST

## 2025-01-03 ENCOUNTER — OFFICE VISIT (OUTPATIENT)
Dept: SLEEP MEDICINE | Facility: HOSPITAL | Age: 45
End: 2025-01-03
Payer: COMMERCIAL

## 2025-01-03 VITALS — OXYGEN SATURATION: 97 % | BODY MASS INDEX: 48.82 KG/M2 | WEIGHT: 293 LBS | HEART RATE: 95 BPM | HEIGHT: 65 IN

## 2025-01-03 DIAGNOSIS — R29.818 SUSPECTED SLEEP APNEA: Primary | ICD-10-CM

## 2025-01-03 DIAGNOSIS — E66.01 SEVERE OBESITY (BMI >= 40): ICD-10-CM

## 2025-01-03 DIAGNOSIS — R06.83 SNORING: ICD-10-CM

## 2025-01-03 DIAGNOSIS — R51.9 MORNING HEADACHE: ICD-10-CM

## 2025-01-03 PROCEDURE — G0463 HOSPITAL OUTPT CLINIC VISIT: HCPCS

## 2025-01-03 NOTE — PROGRESS NOTES
[facility]  [address]  [address]  [place] [address]  Phone   Fax      Jailyn IRWIN  [id number]   1980  44 y.o.  female      Referring Provider and PCP: Shasha Yoon APRN    Type of service: Initial Sleep Medicine Consult  Date of service: 1/3/2025          CHIEF COMPLAINT: Suspected sleep apnea      HISTORY OF PRESENT ILLNESS:  Jailyn IRWIN 44 y.o. was seen today on 1/3/2025 at [facility].   Patient has a history of prediabetes, dysthymia, fibromyalgia, for which the patient follows with outside providers. Patient has no history of tonsillectomy, adenoidectomy, nasal surgery, UPPP.  Patient presents today with symptoms of snoring, trouble falling and staying asleep at night, morning headaches, non-restorative sleep, and suspected sleep apnea.  She has a family history of sleep apnea.  Her  also has sleep apnea and is on a CPAP.  Usually it takes patient 30 to 60 minutes to fall asleep but recently longer, possibly some stress.  She does feel her mood is well-controlled with current regimen, denies any SI or HI.  Has been getting about 5 to 7 hours of sleep per night on average.  May take a nap.  She reports she gets the kids to bed and then will scroll on her phone in the living room for a couple hours and then usually in bed between midnight and 1 AM.  We did discuss healthy sleep habits in detail.        SLEEP HISTORY:  Sleep schedule:  Bedtime: Midnight to 1 AM  Wake time: 8 AM weekdays, 8 to 9 AM weekends  Time it takes to fall asleep: 30 to 60 minutes  Average hours of sleep: 5 to 7 hours  Number of naps per day: 0-1    Symptoms:   In addition to the above, patient reports the following associated symptoms:  Have you ever awakened gasping for breath, coughing, choking: No   Change in weight:  Yes lost 80 pounds  Morning headaches:  Yes   Awaken with a sore throat or dry mouth:  Yes   Leg jerking at night:  No   Creepy  "crawly feeling in legs/urge to move legs: No   Teeth grinding: No   Have you ever awakened at night with a sour taste or burning sensation in your chest:  No   Do you have muscle weakness with laughing or anger:  No   Have you ever felt paralyzed while going to sleep or waking up:  No   Sleepwalking: No   Nightmares: No   Nocturia (urination at night): 3-4 times per night  Memory Problem: No     Medical Conditions (PMH):   Dysthymia  Fibromyalgia  Prediabetes    Social history:  Do you drive a commercial vehicle:  No   Shift work:  No   Tobacco use:  No   Alcohol use: 0 per week  Caffeinated drinks: 3-4 per day    Family History (parents and siblings) (pertaining to sleep medicine):  Sleep apnea  Obesity    Medications: reviewed    Allergies:  Patient has no known allergies.      REVIEW OF SYSTEMS:  Pertinent positive symptoms are:  Snoring  [place] Sleepiness Scale of Total score: 9   Fatigue       PHYSICAL EXAM:  CONSTITUTINONAL:   Vitals:    01/03/25 1432   Pulse: 95   SpO2: 97%   Weight: 136 kg (299 lb)   Height: 165.1 cm (65\")    Body mass index is 49.76 kg/m².   HEAD: atraumatic, normocephalic   THROAT: tonsils are not significant, Mallampati class III-IV  NECK: Neck Circumference: 15 inches, trachea is midline  RESPIRATORY SYSTEM: Respirations even, unlabored, normal rate  CARDIOVASULAR SYSTEM: Normal rate, no edema   NEUROLOGICAL SYSTEM: Alert and oriented x 3  PSYCHIATRIC SYSTEM: Mood is normal/ appropriate     Office note(s) from care team reviewed. Office note(s) reviewed: 11/15/24 primary care note    Labs/ Test Results Reviewed:  TSH          8/2/2024    11:10   TSH   TSH 2.490       Most Recent A1C          8/2/2024    11:10   HGBA1C Most Recent   Hemoglobin A1C 5.60               ASSESSMENT AND PLAN:   Suspected sleep apnea: patient's symptoms and physical examination are concerning for possible sleep apnea.   I discussed the signs, symptoms, and pathophysiology of sleep apnea with this patient.  I " also discussed the possible complications of untreated sleep apnea including but not limited to potential risk of resistant hypertension, insulin resistance, pulmonary hypertension, atrial fibrillation or other arrhythmias, heart attack, stroke, nonrestorative sleep with hypersomnia which can increase risk for motor vehicle accidents, etc.   Different testing methods including home-based and lab based sleep studies were discussed with this patient.   Based on patient history and physical examination, will proceed with home sleep study.  The order for the sleep study is placed in [facility].  The test will be scheduled after prior authorization has been obtained through patient's insurance.  Discussed overview of treatment options for sleep apnea in the office today including PAP therapy, and treatment/ management will be discussed in more detail with this patient after the test is completed.  All questions were answered to patient's satisfaction.   Snoring: snoring is the sound created by turbulent airflow vibrating upper airway soft tissue.  I have also discussed factors affecting snoring including sleep deprivation, sleeping on the back and alcohol ingestion. To minimize snoring, patient is advised to have adequate sleep, sleep on their side, and avoid alcohol and sedative medications around bedtime.   Daytime fatigue: [place] Sleepiness Scale of Total score: 9.  There are many causes of daytime sleepiness and/or fatigue.  Rule out sleep apnea as a contributing factor, as above.  Do not drive, operate heavy machinery, or do activities that require high concentration if feeling tired/drowsy.  Severe Obesity: Body mass index is 49.76 kg/m².. Patients who are overweight or obese are at increased risk of sleep apnea/ sleep disordered breathing. Weight reduction and healthy lifestyle are encouraged in overweight/ obese patients as part of a comprehensive approach to sleep apnea treatment.     Trouble sleeping: We discussed  healthy sleep habits in detail.  Recommended trying to shut down electronics 30 to 60 minutes before bed.  Discussed relaxing bedtime routine.  Also discussed CBT[facility]I  garrison [facility].  Evaluate for sleep apnea as a contributing factor, as above.  Patient feels mood well-controlled at this time.  Consider referral for CBT-I if symptoms worsen or fail to improve.    I have also discussed with the patient the following  Sleep hygiene: try to maintain a regular bed time and wake time, avoid watching TV/ using electronic devices in bed (including cell phones), limit caffeinated and alcoholic beverages before bed, try to maintain a cool and quiet sleep environment, avoid daytime naps  Adequate amount of sleep: most people need around 7 to 9 hours of sleep each night        Patient will follow-up after study, 31 to 90 days after PAP therapy initiated if applicable, or contact the office sooner for questions or concerns. Patient's questions were answered.            Thank you again for asking me to consult on this patient.  Please do not hesitate to call me if you have additional questions or concerns.       Veronica Hawkins DNP, APRN  [facility]

## 2025-01-10 ENCOUNTER — OFFICE VISIT (OUTPATIENT)
Dept: FAMILY MEDICINE CLINIC | Facility: CLINIC | Age: 45
End: 2025-01-10
Payer: COMMERCIAL

## 2025-01-10 VITALS
OXYGEN SATURATION: 99 % | HEIGHT: 65 IN | TEMPERATURE: 97.3 F | HEART RATE: 105 BPM | DIASTOLIC BLOOD PRESSURE: 80 MMHG | BODY MASS INDEX: 48.82 KG/M2 | RESPIRATION RATE: 14 BRPM | SYSTOLIC BLOOD PRESSURE: 128 MMHG | WEIGHT: 293 LBS

## 2025-01-10 DIAGNOSIS — E66.01 CLASS 3 SEVERE OBESITY WITH SERIOUS COMORBIDITY AND BODY MASS INDEX (BMI) OF 45.0 TO 49.9 IN ADULT, UNSPECIFIED OBESITY TYPE: ICD-10-CM

## 2025-01-10 DIAGNOSIS — E66.813 CLASS 3 SEVERE OBESITY WITH SERIOUS COMORBIDITY AND BODY MASS INDEX (BMI) OF 45.0 TO 49.9 IN ADULT, UNSPECIFIED OBESITY TYPE: ICD-10-CM

## 2025-01-10 DIAGNOSIS — Z76.89 ENCOUNTER FOR WEIGHT MANAGEMENT: Primary | ICD-10-CM

## 2025-01-10 PROCEDURE — 99214 OFFICE O/P EST MOD 30 MIN: CPT | Performed by: NURSE PRACTITIONER

## 2025-01-10 RX ORDER — IBUPROFEN 800 MG/1
800 TABLET, FILM COATED ORAL
COMMUNITY
Start: 2024-12-02

## 2025-01-10 NOTE — PROGRESS NOTES
Chief Complaint  Encounter for weight management    Subjective        Jailyn IRWIN presents to [facility]  History of Present Illness    History of Present Illness  The patient presents for weight management, sleep issues, and mild cough.    She reports a slight weight loss, which she attributes to her increased protein intake, consuming approximately 90 to 100 mg daily through shakes, snacks, and meals. She has not experienced any constipation, which she believes is due to her current medication regimen. She has initiated a mild exercise routine, incorporating more walking into her daily activities. Despite her intentions to use the treadmill, she has not yet done so. She experienced a severe menstrual period following a recent procedure, which led to a week of inactivity. However, she has been engaging in light physical activity such as walking her dog. She expresses a preference for gym workouts over home exercises. She has set a personal goal to lose 10 pounds before her upcoming cruise on 02/22/2025, planning to achieve this through regular gym visits and home exercises, dietary mods.    She reports unsatisfactory sleep quality. She has a scheduled sleep study on 01/14/2025. She experiences difficulty falling asleep due to an overactive mind, night sweats, and frequent urination. She does not wish to take any sleep aids. She plans to implement sleep hygiene practices and intends to read before bed. She recalls an incident where she was unable to sleep for two hours, but fell asleep immediately watching the news. She identifies her phone as a potential sleep disruptor. She describes herself as a light sleeper, often waking up when her daughter calls during her night shifts.    She reports no shortness of breath but mentions a minor cough, which she believes is weather-related. No dyspnea, other sx.        Objective   Vital Signs:  /80   Pulse 105   Temp 97.3 °F (36.3 °C)  "(Infrared)   Resp 14   Ht 165.1 cm (65\")   Wt 136 kg (298 lb 14.4 oz)   SpO2 99%   BMI 49.74 kg/m²   Estimated body mass index is 49.74 kg/m² as calculated from the following:    Height as of this encounter: 165.1 cm (65\").    Weight as of this encounter: 136 kg (298 lb 14.4 oz).               Physical Exam  Vitals and nursing note reviewed.   Constitutional:       General: She is not in acute distress.     Appearance: She is well-developed. She is not ill-appearing or diaphoretic.   HENT:      Head: Normocephalic and atraumatic.   Eyes:      General:         Right eye: No discharge.         Left eye: No discharge.      Conjunctiva/sclera: Conjunctivae normal.   Cardiovascular:      Rate and Rhythm: Normal rate and regular rhythm.   Pulmonary:      Effort: Pulmonary effort is normal.      Breath sounds: Normal breath sounds.   Abdominal:      General: Bowel sounds are normal.      Palpations: Abdomen is soft.   Musculoskeletal:         General: No deformity.      Comments: Gait smooth and steady   Skin:     General: Skin is warm and dry.   Neurological:      General: No focal deficit present.      Mental Status: She is alert and oriented to person, place, and time.   Psychiatric:         Mood and Affect: Mood normal.         Behavior: Behavior normal.          Physical Exam       Result Review :            Results                  Assessment and Plan     Diagnoses and all orders for this visit:    1. Encounter for weight management (Primary)    2. Class 3 severe obesity with serious comorbidity and body mass index (BMI) of 45.0 to 49.9 in adult, unspecified obesity type        Assessment & Plan  1. Weight management for obesity  She has set a goal to lose 10 pounds before her upcoming cruise on 02/22/2025, which equates to a weekly weight loss target of 2 pounds. She is advised to engage in enjoyable physical activities such as dancing, and to incorporate gym workouts into her routine on days when she is able " to. On days when gym attendance is not possible, she is encouraged to find alternative ways to stay active at home, even if it is for a short duration of 5 minutes.Continue to keep protein appr 100g/day.  Continue current current metformin, semaglutide.     2. Sleep issues.  She has a scheduled sleep study on 01/14/2025. She is advised to maintain good sleep hygiene practices to improve her sleep quality. This includes keeping her phone away before bedtime and possibly listening to something boring like news instead of watching TV.  Awaiting sleep study-reviewed and discussed sleep med note and recommendations, plans.     3. Mild cough-no intervention needed  She is advised to practice good hand hygiene, particularly washing her hands upon returning home, to prevent the spread of infections.    Bariatric form for weight loss ctr completed and faxed.           I spent 30 minutes caring for Jailyn on this date of service. This time includes time spent by me in the following activities:preparing for the visit, performing a medically appropriate examination and/or evaluation , counseling and educating the patient/family/caregiver, referring and communicating with other health care professionals , and documenting information in the medical record  Follow Up     No follow-ups on file.  Patient was given instructions and counseling regarding her condition or for health maintenance advice. Please see specific information pulled into the AVS if appropriate.    Patient or patient representative verbalized consent for the use of Ambient Listening during the visit with  MATEO Condon for chart documentation. 1/10/2025  11:11 EST

## 2025-01-20 ENCOUNTER — HOSPITAL ENCOUNTER (OUTPATIENT)
Dept: SLEEP MEDICINE | Facility: HOSPITAL | Age: 45
Discharge: HOME | End: 2025-01-20
Admitting: NURSE PRACTITIONER
Payer: COMMERCIAL

## 2025-01-20 DIAGNOSIS — R06.83 SNORING: ICD-10-CM

## 2025-01-20 DIAGNOSIS — R51.9 MORNING HEADACHE: ICD-10-CM

## 2025-01-20 DIAGNOSIS — R29.818 SUSPECTED SLEEP APNEA: ICD-10-CM

## 2025-01-20 DIAGNOSIS — E66.01 SEVERE OBESITY (BMI >= 40): ICD-10-CM

## 2025-01-20 PROCEDURE — G0399 HOME SLEEP TEST/TYPE 3 PORTA: HCPCS

## 2025-01-23 DIAGNOSIS — R06.83 SNORING: ICD-10-CM

## 2025-01-23 DIAGNOSIS — G47.33 OSA (OBSTRUCTIVE SLEEP APNEA): Primary | ICD-10-CM

## 2025-01-24 ENCOUNTER — TELEPHONE (OUTPATIENT)
Dept: SLEEP MEDICINE | Facility: HOSPITAL | Age: 45
End: 2025-01-24
Payer: COMMERCIAL

## 2025-02-10 ENCOUNTER — OFFICE VISIT (OUTPATIENT)
Dept: FAMILY MEDICINE CLINIC | Facility: CLINIC | Age: 45
End: 2025-02-10
Payer: COMMERCIAL

## 2025-02-10 VITALS
BODY MASS INDEX: 48.82 KG/M2 | HEART RATE: 71 BPM | SYSTOLIC BLOOD PRESSURE: 128 MMHG | HEIGHT: 65 IN | DIASTOLIC BLOOD PRESSURE: 88 MMHG | OXYGEN SATURATION: 99 % | RESPIRATION RATE: 14 BRPM | WEIGHT: 293 LBS | TEMPERATURE: 97.1 F

## 2025-02-10 DIAGNOSIS — Z76.89 ENCOUNTER FOR WEIGHT MANAGEMENT: Primary | ICD-10-CM

## 2025-02-10 DIAGNOSIS — E66.01 CLASS 3 SEVERE OBESITY WITH SERIOUS COMORBIDITY AND BODY MASS INDEX (BMI) OF 45.0 TO 49.9 IN ADULT, UNSPECIFIED OBESITY TYPE: ICD-10-CM

## 2025-02-10 DIAGNOSIS — R73.03 PREDIABETES: ICD-10-CM

## 2025-02-10 DIAGNOSIS — J40 BRONCHITIS: ICD-10-CM

## 2025-02-10 DIAGNOSIS — E66.813 CLASS 3 SEVERE OBESITY WITH SERIOUS COMORBIDITY AND BODY MASS INDEX (BMI) OF 45.0 TO 49.9 IN ADULT, UNSPECIFIED OBESITY TYPE: ICD-10-CM

## 2025-02-10 DIAGNOSIS — R00.2 PALPITATIONS: ICD-10-CM

## 2025-02-10 DIAGNOSIS — D50.0 IRON DEFICIENCY ANEMIA DUE TO CHRONIC BLOOD LOSS: ICD-10-CM

## 2025-02-10 PROCEDURE — 99214 OFFICE O/P EST MOD 30 MIN: CPT | Performed by: NURSE PRACTITIONER

## 2025-02-10 RX ORDER — ALBUTEROL SULFATE 90 UG/1
2 INHALANT RESPIRATORY (INHALATION) EVERY 4 HOURS PRN
Qty: 18 G | Refills: 0 | Status: SHIPPED | OUTPATIENT
Start: 2025-02-10

## 2025-02-10 NOTE — PROGRESS NOTES
Chief Complaint  Encounter for weight management    Subjective        Jailyn IRWIN presents to [facility]  History of Present Illness    History of Present Illness  The patient presents for evaluation of weight management, cough, and heart palpitations    She reports a positive response to semaglutide, with a noted decrease in appetite and no adverse effects. She has been adhering to a gym routine, although there was a temporary disruption due to the death of her father on 12th. Despite this, she resumed her gym activities in the third week following his passing. However, she experienced an illness in the subsequent week, which necessitated a visit to urgent care due to a severe cough. She is currently in her second week of semaglutide treatment and is seeking a refill. She expresses concern about potential weight loss, given her recent lack of focus on diet and exercise. Her last blood work was conducted in November 2024. She has not consumed any food today. She had a complete blood count (CBC) test in December 2024. She recently started using birth control and did not menstruate last week. She is interested in knowing her iron levels. She is not currently taking iron supplements.    She sought medical attention at an urgent care facility on Friday due to a persistent cough, which was accompanied by difficulty breathing during speech. Despite negative test results, she was prescribed medication. Her condition has since improved slightly. She reports no chest pain but admits to experiencing heart palpitations, which she attributes to TheraFlu DM. She also suspects a possible spike in her blood pressure. She reports no gastrointestinal symptoms such as nausea, vomiting, diarrhea, constipation, or changes in stool. Her blood pressure reading today is within normal limits at 128/88. She reports no fever but expresses concern about potential pneumonia.     She had a consultation with a  "cardiologist in 2017.    MEDICATIONS         Objective   Vital Signs:  /88   Pulse 71   Temp 97.1 °F (36.2 °C) (Infrared)   Resp 14   Ht 165.1 cm (65\")   Wt 134 kg (296 lb)   SpO2 99%   BMI 49.26 kg/m²   Estimated body mass index is 49.26 kg/m² as calculated from the following:    Height as of this encounter: 165.1 cm (65\").    Weight as of this encounter: 134 kg (296 lb).               Physical Exam  Vitals and nursing note reviewed.   Constitutional:       General: She is not in acute distress.     Appearance: She is well-developed. She is obese. She is not ill-appearing.   HENT:      Head: Normocephalic and atraumatic.      Right Ear: Tympanic membrane, ear canal and external ear normal.      Left Ear: Tympanic membrane, ear canal and external ear normal.      Mouth/Throat:      Mouth: Mucous membranes are moist.      Pharynx: Uvula midline. No posterior oropharyngeal erythema.   Eyes:      General: No scleral icterus.        Right eye: No discharge.         Left eye: No discharge.      Conjunctiva/sclera: Conjunctivae normal.      Pupils: Pupils are equal, round, and reactive to light.   Neck:      Thyroid: No thyromegaly.   Cardiovascular:      Rate and Rhythm: Normal rate. Rhythm irregular.      Heart sounds: Murmur (2/6 left sternal border) heard.   Pulmonary:      Effort: Pulmonary effort is normal.      Breath sounds: No wheezing.      Comments: Right middle and lower lobes slightly tight sounding  Abdominal:      General: Bowel sounds are normal. There is no distension.      Palpations: Abdomen is soft.      Tenderness: There is no abdominal tenderness.   Musculoskeletal:         General: No deformity.      Cervical back: Neck supple.      Comments: Gait smooth and steady   Lymphadenopathy:      Cervical: No cervical adenopathy.   Skin:     General: Skin is warm and dry.   Neurological:      General: No focal deficit present.      Mental Status: She is alert and oriented to person, place, and " time.   Psychiatric:         Mood and Affect: Mood normal.         Behavior: Behavior normal.         Thought Content: Thought content normal.          Physical Exam      Vital Signs  Blood pressure is 128/88.     Result Review :            Results             ECG 12 Lead    Date/Time: 2/11/2025 5:01 PM  Performed by: Shasha Yoon APRN    Authorized by: Shasha Yoon APRN  Comparison: compared with previous ECG from 9/1/2017  Comparison to previous ECG: Previous showed T wave changes-no PVC  Rhythm: sinus rhythm  Ectopy: unifocal PVCs  Rate: normal  BPM: 82  Conduction: conduction normal  QRS axis: normal  Other: no other findings    Clinical impression: non-specific ECG            Assessment and Plan     Diagnoses and all orders for this visit:    1. Encounter for weight management (Primary)    2. Class 3 severe obesity with serious comorbidity and body mass index (BMI) of 45.0 to 49.9 in adult, unspecified obesity type  -     Vitamin B12  -     CBC (No Diff)  -     Comprehensive Metabolic Panel  -     Hemoglobin A1c  -     Lipid Panel With LDL / HDL Ratio  -     Iron Profile  -     Ferritin  -     Semaglutide-Weight Management 1.7 MG/0.75ML solution auto-injector; Inject 0.75 mL under the skin into the appropriate area as directed 1 (One) Time Per Week.  Dispense: 3 mL; Refill: 2    3. Palpitations    4. Bronchitis  -     albuterol sulfate HFA (Ventolin HFA) 108 (90 Base) MCG/ACT inhaler; Inhale 2 puffs Every 4 (Four) Hours As Needed for Wheezing or Shortness of Air.  Dispense: 18 g; Refill: 0    5. Iron deficiency anemia due to chronic blood loss    6. Prediabetes    Other orders  -     ECG 12 Lead        Assessment & Plan  1. Weight management for obesity-BMI of 49.26  She has demonstrated weight loss since her last visit, indicating the effectiveness of the current semaglutide dosage. A prescription refill for semaglutide will be provided, to be filled when due. Laboratory tests will be conducted to  assess cholesterol levels, A1c, and iron stores.    2. Bronchitis.  Her heart rate is slightly irregular, which could potentially be a contributing factor to her cough. There is a slight decrease in lung function on the right side, almost like asthma. An EKG will be performed to rule out any cardiac-related causes for her cough. An inhaler will be prescribed, with instructions to use 2 puffs every 4 hours as needed. She may continue taking promethazine DM if necessary.    3. Palpitations  Her heart rate is slightly irregular. An EKG performed to ensure that the irregular heart rate is not causing her cough. Was normal other than she had PVCs which were audible with auscultation.  Will check labs.  She has had abnormal vaginal bleeding and iron deficiency anemia which could be causing palpitations and slight murmur.  Further recommendations upon lab review.            Follow Up     No follow-ups on file.  Patient was given instructions and counseling regarding her condition or for health maintenance advice. Please see specific information pulled into the AVS if appropriate.    Patient or patient representative verbalized consent for the use of Ambient Listening during the visit with  MATEO Condon for chart documentation. 2/11/2025  17:05 EST

## 2025-02-11 LAB
ALBUMIN SERPL-MCNC: 4.3 G/DL (ref 3.9–4.9)
ALP SERPL-CCNC: 71 IU/L (ref 44–121)
ALT SERPL-CCNC: 16 IU/L (ref 0–32)
AST SERPL-CCNC: 15 IU/L (ref 0–40)
BILIRUB SERPL-MCNC: 0.3 MG/DL (ref 0–1.2)
BUN SERPL-MCNC: 13 MG/DL (ref 6–24)
BUN/CREAT SERPL: 18 (ref 9–23)
CALCIUM SERPL-MCNC: 9.6 MG/DL (ref 8.7–10.2)
CHLORIDE SERPL-SCNC: 105 MMOL/L (ref 96–106)
CHOLEST SERPL-MCNC: 180 MG/DL (ref 100–199)
CO2 SERPL-SCNC: 21 MMOL/L (ref 20–29)
CREAT SERPL-MCNC: 0.73 MG/DL (ref 0.57–1)
EGFRCR SERPLBLD CKD-EPI 2021: 104 ML/MIN/1.73
ERYTHROCYTE [DISTWIDTH] IN BLOOD BY AUTOMATED COUNT: 19.3 % (ref 11.7–15.4)
FERRITIN SERPL-MCNC: 5 NG/ML (ref 15–150)
GLOBULIN SER CALC-MCNC: 3 G/DL (ref 1.5–4.5)
GLUCOSE SERPL-MCNC: 93 MG/DL (ref 70–99)
HBA1C MFR BLD: 5.9 % (ref 4.8–5.6)
HCT VFR BLD AUTO: 39.1 % (ref 34–46.6)
HDLC SERPL-MCNC: 39 MG/DL
HGB BLD-MCNC: 11.9 G/DL (ref 11.1–15.9)
IRON SATN MFR SERPL: 6 % (ref 15–55)
IRON SERPL-MCNC: 23 UG/DL (ref 27–159)
LDLC SERPL CALC-MCNC: 126 MG/DL (ref 0–99)
LDLC/HDLC SERPL: 3.2 RATIO (ref 0–3.2)
MCH RBC QN AUTO: 23.1 PG (ref 26.6–33)
MCHC RBC AUTO-ENTMCNC: 30.4 G/DL (ref 31.5–35.7)
MCV RBC AUTO: 76 FL (ref 79–97)
PLATELET # BLD AUTO: 469 X10E3/UL (ref 150–450)
POTASSIUM SERPL-SCNC: 4.6 MMOL/L (ref 3.5–5.2)
PROT SERPL-MCNC: 7.3 G/DL (ref 6–8.5)
RBC # BLD AUTO: 5.15 X10E6/UL (ref 3.77–5.28)
SODIUM SERPL-SCNC: 138 MMOL/L (ref 134–144)
TIBC SERPL-MCNC: 406 UG/DL (ref 250–450)
TRIGL SERPL-MCNC: 81 MG/DL (ref 0–149)
UIBC SERPL-MCNC: 383 UG/DL (ref 131–425)
VIT B12 SERPL-MCNC: 893 PG/ML (ref 232–1245)
VLDLC SERPL CALC-MCNC: 15 MG/DL (ref 5–40)
WBC # BLD AUTO: 7.3 X10E3/UL (ref 3.4–10.8)

## 2025-02-11 PROCEDURE — 93000 ELECTROCARDIOGRAM COMPLETE: CPT | Performed by: NURSE PRACTITIONER

## 2025-02-11 RX ORDER — MULTIVIT WITH MINERALS/LUTEIN
250 TABLET ORAL DAILY
Qty: 90 TABLET | Refills: 0 | Status: SHIPPED | OUTPATIENT
Start: 2025-02-11

## 2025-02-11 RX ORDER — FERROUS GLUCONATE 324(38)MG
324 TABLET ORAL
Qty: 90 TABLET | Refills: 0 | Status: SHIPPED | OUTPATIENT
Start: 2025-02-11

## 2025-03-10 ENCOUNTER — OFFICE VISIT (OUTPATIENT)
Dept: FAMILY MEDICINE CLINIC | Facility: CLINIC | Age: 45
End: 2025-03-10
Payer: COMMERCIAL

## 2025-03-10 VITALS
WEIGHT: 293 LBS | RESPIRATION RATE: 14 BRPM | TEMPERATURE: 97.8 F | HEIGHT: 65 IN | BODY MASS INDEX: 48.82 KG/M2 | HEART RATE: 84 BPM | DIASTOLIC BLOOD PRESSURE: 86 MMHG | OXYGEN SATURATION: 94 % | SYSTOLIC BLOOD PRESSURE: 128 MMHG

## 2025-03-10 DIAGNOSIS — E66.01 OBESITY, MORBID, BMI 40.0-49.9: Chronic | ICD-10-CM

## 2025-03-10 DIAGNOSIS — Z76.89 ENCOUNTER FOR WEIGHT MANAGEMENT: Primary | ICD-10-CM

## 2025-03-10 DIAGNOSIS — R73.03 PREDIABETES: ICD-10-CM

## 2025-03-10 DIAGNOSIS — E78.00 PURE HYPERCHOLESTEROLEMIA: Chronic | ICD-10-CM

## 2025-03-10 PROCEDURE — 99214 OFFICE O/P EST MOD 30 MIN: CPT | Performed by: NURSE PRACTITIONER

## 2025-03-10 NOTE — PROGRESS NOTES
Chief Complaint  Encounter for weight management    Subjective        Jailyn IRWIN presents to [facility]  History of Present Illness    History of Present Illness  The patient presents for a monthly visit for weight management for obesity with BMI 49.87    She reports no significant concerns since her last visit on 02/10/2025. She recently embarked on a cruise, during which she did not administer her weight loss injection but maintained a healthy diet and engaged in regular physical activity, including walking. Upon her return, she resumed her injection regimen, which she found to be highly effective. Her dietary intake this week has been modest, consisting of protein shakes, yogurt, and a protein-rich dinner, with a daily protein intake ranging between 90 to 100 grams. Despite these efforts, she has experienced an unexpected weight gain, which she attributes to potential water retention. She does not report any significant swelling. She has not yet resumed her gym routine post-cruise but acknowledges the need to do so. She has observed that increased physical activity results in higher caloric expenditure. She is currently on her fifth month of injections and reports no adverse effects. She has one dose remaining and is seeking a refill. She expresses interest in increasing the dosage of her injections. She underwent blood work in February 2025 and has reviewed the results. She has one more six-month weight mgmt visit remaining for bariatrics. She expresses mild anxiety about potential surgical intervention, citing a recent fatality in her mother's cousin due to sepsis following a gastric leak post-surgery.    She reports a persistent cough, which she describes as dry and lingering. She does not experience any chest pain or heart palpitations.    She also reports occasional acid reflux, which she manages with Tums as needed. She notes an improvement in her reflux symptoms since  "starting the injections.    FAMILY HISTORY      MEDICATIONS  Current       Objective   Vital Signs:  /86   Pulse 84   Temp 97.8 °F (36.6 °C) (Infrared)   Resp 14   Ht 165.1 cm (65\")   Wt 136 kg (299 lb 11.2 oz)   SpO2 94%   BMI 49.87 kg/m²   Estimated body mass index is 49.87 kg/m² as calculated from the following:    Height as of this encounter: 165.1 cm (65\").    Weight as of this encounter: 136 kg (299 lb 11.2 oz).               Physical Exam  Vitals and nursing note reviewed.   Constitutional:       General: She is not in acute distress.     Appearance: She is well-developed. She is obese. She is not ill-appearing or diaphoretic.   HENT:      Head: Normocephalic and atraumatic.   Eyes:      General:         Right eye: No discharge.         Left eye: No discharge.      Conjunctiva/sclera: Conjunctivae normal.   Cardiovascular:      Rate and Rhythm: Normal rate and regular rhythm.   Pulmonary:      Effort: Pulmonary effort is normal.      Breath sounds: Normal breath sounds. No wheezing, rhonchi or rales.   Abdominal:      General: Bowel sounds are normal. There is no distension.      Palpations: Abdomen is soft.      Tenderness: There is no abdominal tenderness.   Musculoskeletal:         General: No deformity.      Comments: Gait smooth and steady   Skin:     General: Skin is warm and dry.   Neurological:      Mental Status: She is alert and oriented to person, place, and time.   Psychiatric:         Mood and Affect: Mood normal.         Behavior: Behavior normal.          Physical Exam       Result Review :            Results  Laboratory Studies  Iron levels were low. Lipids were slightly elevated. A1c was elevated.                Assessment and Plan     Diagnoses and all orders for this visit:    1. Encounter for weight management (Primary)    2. Obesity, morbid, BMI 40.0-49.9  -     Semaglutide-Weight Management 2.4 MG/0.75ML solution auto-injector; Inject 0.75 mL under the skin into the " appropriate area as directed 1 (One) Time Per Week.  Dispense: 3 mL; Refill: 2    3. Prediabetes  -     Semaglutide-Weight Management 2.4 MG/0.75ML solution auto-injector; Inject 0.75 mL under the skin into the appropriate area as directed 1 (One) Time Per Week.  Dispense: 3 mL; Refill: 2    4. Pure hypercholesterolemia        Assessment & Plan  1. Weight management for obesity-BMI 49.87  Her weight has remained stable, with no significant gain or loss observed. She has been adhering to a high-protein diet, consuming between  grams of protein daily. She missed her weight loss injection during a recent cruise but resumed it afterward. She reports no side effects from the current medication. A prescription for an increased dosage of her current weight loss medication will be sent to [facility]. She is advised to continue her protein intake and resume her gym activities to enhance weight loss.    2. Persistent cough.  She reports a lingering dry cough since her last visit. No wheezing, chest pain, heart palpitations, nausea, vomiting, diarrhea, constipation, blood in stool, or changes in stool characteristics were noted. She is advised to monitor her symptoms and report any changes.    3. Acid reflux.  She experiences occasional acid reflux, which has improved since starting her current medication. She uses Tums as needed for relief. She is advised to continue using Tums as needed and monitor her symptoms.    4. Elevated lipids.  Her lipid levels were slightly elevated in the recent blood work. She is advised to balance her intake of saturated fats and sugars to manage her cholesterol levels better.    5. Elevated A1c.  Her A1c levels were elevated in the recent blood work. She is advised to continue monitoring her blood sugar levels and maintain a balanced diet to manage her A1c levels better.            Follow Up     No follow-ups on file.  Patient was given instructions and counseling regarding her  condition or for health maintenance advice. Please see specific information pulled into the AVS if appropriate.    Patient or patient representative verbalized consent for the use of Ambient Listening during the visit with  MATEO Condon for chart documentation. 3/10/2025  11:57 EDT

## 2025-03-14 ENCOUNTER — TELEPHONE (OUTPATIENT)
Dept: SLEEP MEDICINE | Facility: HOSPITAL | Age: 45
End: 2025-03-14
Payer: COMMERCIAL

## 2025-04-10 ENCOUNTER — OFFICE VISIT (OUTPATIENT)
Dept: FAMILY MEDICINE CLINIC | Facility: CLINIC | Age: 45
End: 2025-04-10
Payer: COMMERCIAL

## 2025-04-10 VITALS
TEMPERATURE: 97.5 F | HEIGHT: 65 IN | HEART RATE: 82 BPM | OXYGEN SATURATION: 97 % | DIASTOLIC BLOOD PRESSURE: 83 MMHG | BODY MASS INDEX: 48.82 KG/M2 | SYSTOLIC BLOOD PRESSURE: 127 MMHG | RESPIRATION RATE: 14 BRPM | WEIGHT: 293 LBS

## 2025-04-10 DIAGNOSIS — E66.01 OBESITY, MORBID, BMI 40.0-49.9: Chronic | ICD-10-CM

## 2025-04-10 DIAGNOSIS — R73.03 PREDIABETES: ICD-10-CM

## 2025-04-10 NOTE — PROGRESS NOTES
Chief Complaint  Encounter for weight management    Subjective        Jailyn IRWIN presents to [facility]  History of Present Illness    History of Present Illness  The patient presents for weight management for obesity with BMI of 49    She is currently on a high-protein diet, consuming approximately 75 to 90 grams of protein daily. Her dietary regimen includes a morning protein shake providing 30 grams of protein, followed by a protein-rich dinner. She also incorporates cheese sticks and other protein sources as snacks throughout the day. She has been making efforts to increase her intake of fruits and vegetables, with a particular focus on bananas and apples. She has identified boredom as a trigger for snacking during her workday, which she manages by opting for protein-rich snacks such as almonds, pistachios, cheese sticks, and protein yogurt.     She is currently on semaglutide, which she reports as effective in maintaining satiety without causing any gastrointestinal discomfort. She is seeking a refill of this medication.     She plans to visit her bariatric specialist today to submit some paperwork. She has expressed interest in surgical intervention for weight loss but is concerned about potential weight regain post-surgery. She has a history of binge eating, which she believes is being managed effectively with the semaglutide injections. She has never undergone any surgical procedures in the past.     She has been diagnosed with polycystic ovary syndrome (PCOS) and depression, both of which have improved with her recent weight loss.    She maintains an active lifestyle, engaging in physical activities 3 to 4 times per week, including coaching a softball team. She does not have any issues with running. She has been doing weight training at the gym. She has a gym membership but admits to inconsistent attendance. She prefers heavy weightlifting over light weights with more  "repetitions. She has attempted running on a treadmill but experiences difficulty with breathing, which she attributes to fatigue. She has previously engaged in regular running, covering a mile every other day, but currently finds it challenging to run for more than a minute on the treadmill. She has attempted walking on a treadmill but experiences ankle and knee pain after prolonged periods. She has also tried biking for 10 miles daily with minimal resistance, which she finds enjoyable and without any adverse effects.    MEDICATIONS  Current       Objective   Vital Signs:  /83   Pulse 82   Temp 97.5 °F (36.4 °C) (Infrared)   Resp 14   Ht 165.1 cm (65\")   Wt 134 kg (296 lb)   SpO2 97%   BMI 49.26 kg/m²   Estimated body mass index is 49.26 kg/m² as calculated from the following:    Height as of this encounter: 165.1 cm (65\").    Weight as of this encounter: 134 kg (296 lb).               Physical Exam  Vitals and nursing note reviewed.   Constitutional:       General: She is not in acute distress.     Appearance: She is well-developed. She is obese. She is not ill-appearing or diaphoretic.   HENT:      Head: Normocephalic and atraumatic.   Eyes:      General:         Right eye: No discharge.         Left eye: No discharge.      Conjunctiva/sclera: Conjunctivae normal.   Cardiovascular:      Rate and Rhythm: Normal rate and regular rhythm.   Pulmonary:      Effort: Pulmonary effort is normal.      Breath sounds: Normal breath sounds.   Abdominal:      General: Bowel sounds are normal. There is no distension.      Palpations: Abdomen is soft.      Tenderness: There is no abdominal tenderness.   Musculoskeletal:         General: No deformity.      Comments: Gait smooth and steady   Skin:     General: Skin is warm and dry.   Neurological:      General: No focal deficit present.      Mental Status: She is alert and oriented to person, place, and time.   Psychiatric:         Mood and Affect: Mood normal.        "  Behavior: Behavior normal.         Thought Content: Thought content normal.          Physical Exam  Vital Signs  Blood pressure was 127/87.     Result Review :            Results                  Assessment and Plan     Diagnoses and all orders for this visit:    1. Obesity, morbid, BMI 40.0-49.9  -     Semaglutide-Weight Management 2.4 MG/0.75ML solution auto-injector; Inject 0.75 mL under the skin into the appropriate area as directed 1 (One) Time Per Week.  Dispense: 3 mL; Refill: 0    2. Prediabetes  -     Semaglutide-Weight Management 2.4 MG/0.75ML solution auto-injector; Inject 0.75 mL under the skin into the appropriate area as directed 1 (One) Time Per Week.  Dispense: 3 mL; Refill: 0        Assessment & Plan  1. Weight management for obesity with BMI of 49.26  She is currently on semaglutide and reports that it helps her feel full and manage her mental relationship with food. A prescription refill for semaglutide has been provided. She is advised to continue her high-protein diet, aiming for 75-90 grams of protein daily. She is encouraged to eat protein before consuming other foods and to pair high-glycemic fruits like bananas and apples with protein to mitigate blood sugar spikes. She is also advised to avoid bananas due to their high sugar content and to opt for lower-glycemic fruits like berries. She is advised to continue her current exercise routine, which includes weight training and walking. She is encouraged to walk the bases during softball practice and to use the treadmill at an incline of 1-1.5 to avoid knee pain. Biking is also recommended as a low-impact cardio option.    2. Exercise counseling.  She is advised to continue her current exercise routine, which includes weight training and walking. She is encouraged to walk the bases during softball practice and to use the treadmill at an incline of 1-1.5 to avoid knee pain. Biking is also recommended as a low-impact cardio option.             Follow Up     No follow-ups on file.  Patient was given instructions and counseling regarding her condition or for health maintenance advice. Please see specific information pulled into the AVS if appropriate.    Patient or patient representative verbalized consent for the use of Ambient Listening during the visit with  MATEO Condon for chart documentation. 4/25/2025  15:25 EDT

## 2025-07-09 ENCOUNTER — OFFICE VISIT (OUTPATIENT)
Dept: FAMILY MEDICINE CLINIC | Facility: CLINIC | Age: 45
End: 2025-07-09
Payer: COMMERCIAL

## 2025-07-09 VITALS
DIASTOLIC BLOOD PRESSURE: 78 MMHG | BODY MASS INDEX: 48.82 KG/M2 | SYSTOLIC BLOOD PRESSURE: 150 MMHG | HEIGHT: 65 IN | WEIGHT: 293 LBS | HEART RATE: 73 BPM | OXYGEN SATURATION: 97 % | TEMPERATURE: 96.2 F

## 2025-07-09 DIAGNOSIS — Z01.818 PREOPERATIVE CLEARANCE: Primary | ICD-10-CM

## 2025-07-09 DIAGNOSIS — R03.0 ELEVATED BLOOD PRESSURE READING WITHOUT DIAGNOSIS OF HYPERTENSION: ICD-10-CM

## 2025-07-09 DIAGNOSIS — E66.813 CLASS 3 SEVERE OBESITY WITH BODY MASS INDEX (BMI) OF 50.0 TO 59.9 IN ADULT, UNSPECIFIED OBESITY TYPE, UNSPECIFIED WHETHER SERIOUS COMORBIDITY PRESENT: ICD-10-CM

## 2025-07-09 DIAGNOSIS — M79.89 LEG SWELLING: ICD-10-CM

## 2025-07-09 DIAGNOSIS — R73.03 PREDIABETES: ICD-10-CM

## 2025-07-09 PROCEDURE — 99214 OFFICE O/P EST MOD 30 MIN: CPT | Performed by: NURSE PRACTITIONER

## 2025-07-09 RX ORDER — PANTOPRAZOLE SODIUM 40 MG/1
40 TABLET, DELAYED RELEASE ORAL DAILY
COMMUNITY
Start: 2025-07-08 | End: 2025-10-06

## 2025-07-09 RX ORDER — LOSARTAN POTASSIUM AND HYDROCHLOROTHIAZIDE 12.5; 5 MG/1; MG/1
1 TABLET ORAL DAILY
Qty: 30 TABLET | Refills: 1 | Status: SHIPPED | OUTPATIENT
Start: 2025-07-09

## 2025-07-09 NOTE — PROGRESS NOTES
"Chief Complaint  Medical Clearance    Subjective        Jailyn IRWIN presents to [facility]  History of Present Illness    History of Present Illness  The patient presents for preoperative evaluation.    She has been diagnosed with a hiatal hernia, which she was previously unaware of. She has not yet undergone any preoperative testing or scheduled her surgery. The surgery is expected to take place either this month or at the start of the next, depending on insurance approval. She has not been informed about the need for cardiology clearance. She reports no shortness of breath, cough, chest pain, or heart palpitations. She has completed psychological evaluations and found them beneficial. She has not been consuming protein drinks. She has not been monitoring her blood pressure at home. She is currently taking pantoprazole and Cymbalta but is not on semaglutide or metformin.    She has experienced significant leg swelling, which she attributes to fluid retention. She uses compression stockings intermittently and attempts to elevate her legs during breaks at work. Despite these measures, she continues to experience leg swelling on her days off.    She has noticed a slight increase in her blood pressure since gaining weight. Her blood pressure was initially high during this visit but decreased to 140/71 upon recheck.    She has gained weight after discontinuing the use of a compound medication, which led to increased food intake. She experienced severe diarrhea yesterday.    She is taking iron inconsistently.    SOCIAL HISTORY  She reports no alcohol abuse in the last year, has been drug-free for 1 year, and has been smoke-free for 40 years.    FAMILY HISTORY  Her mother has a hiatal hernia.       Objective   Vital Signs:  /78   Pulse 73   Temp 96.2 °F (35.7 °C) (Temporal)   Ht 165.1 cm (65\")   Wt (!) 141 kg (310 lb 3.2 oz)   SpO2 97%   BMI 51.62 kg/m²   Estimated body mass " "index is 51.62 kg/m² as calculated from the following:    Height as of this encounter: 165.1 cm (65\").    Weight as of this encounter: 141 kg (310 lb 3.2 oz).               Physical Exam  Vitals and nursing note reviewed.   Constitutional:       General: She is not in acute distress.     Appearance: She is well-developed. She is obese. She is not ill-appearing or diaphoretic.   HENT:      Head: Normocephalic and atraumatic.   Eyes:      General:         Right eye: No discharge.         Left eye: No discharge.      Conjunctiva/sclera: Conjunctivae normal.   Cardiovascular:      Rate and Rhythm: Normal rate and regular rhythm.      Heart sounds: Murmur (2/6 RSB) heard.   Pulmonary:      Effort: Pulmonary effort is normal.      Breath sounds: Normal breath sounds.   Abdominal:      General: Bowel sounds are normal. There is no distension.      Palpations: Abdomen is soft.      Tenderness: There is no abdominal tenderness.   Musculoskeletal:         General: No deformity.      Cervical back: Neck supple.      Right lower leg: Edema present.      Left lower leg: Edema present.      Comments: Gait smooth and steady   Lymphadenopathy:      Cervical: No cervical adenopathy.   Skin:     General: Skin is warm and dry.   Neurological:      General: No focal deficit present.      Mental Status: She is alert and oriented to person, place, and time.   Psychiatric:         Mood and Affect: Mood normal.         Behavior: Behavior normal.          Physical Exam       Result Review :            Results                  Assessment and Plan     Diagnoses and all orders for this visit:    1. Preoperative clearance (Primary)  -     Adult Transthoracic Echo Complete W/ Cont if Necessary Per Protocol; Future  -     CBC (No Diff)  -     Comprehensive Metabolic Panel  -     Hemoglobin A1c  -     TSH Rfx On Abnormal To Free T4    2. Prediabetes    3. Class 3 severe obesity with body mass index (BMI) of 50.0 to 59.9 in adult, unspecified " obesity type, unspecified whether serious comorbidity present    4. Elevated blood pressure reading without diagnosis of hypertension  -     losartan-hydrochlorothiazide (Hyzaar) 50-12.5 MG per tablet; Take 1 tablet by mouth Daily.  Dispense: 30 tablet; Refill: 1    5. Leg swelling  -     losartan-hydrochlorothiazide (Hyzaar) 50-12.5 MG per tablet; Take 1 tablet by mouth Daily.  Dispense: 30 tablet; Refill: 1        Assessment & Plan  1. Preoperative evaluation.  - Blood pressure is slightly elevated today.  - Known heart murmur necessitated an EKG in the past.  - Echocardiogram will be ordered prior to surgery to ensure no cardiac issues.  - If she does not receive a call to schedule the echocardiogram within the next few days, she should contact the office.  - Will get labs today for preop clearance  - EKG done on 2/11/2025 by myself for heart palpitations and was similar to previous other than she did have PVCs on this EKG and then shown on previous    2.  Elevated blood pressure without diagnosis of hypertension  - Blood pressure is slightly elevated today.  - Advised to monitor blood pressure at home.  - Prescription for losartan HCTZ, half a tablet daily, will be provided to manage blood pressure and prevent surgery delay.  -I think likely she will be able to come off of this postop with weight loss  - Medication sent to pharmacy.    3. Lower extremity edema.  - Reports significant leg swelling, possibly exacerbated by prolonged sitting and heat.  - Advised to use compression stockings, especially during hot weather, and avoid sitting with legs down for extended periods.  - Recommended to elevate legs during breaks and at her desk.  - Lymphedema is not suspected at this time but will be monitored.    4. Weight gain-obesity with BMI of 51.6 to  - Gained weight after discontinuing the use of a compound medication, leading to increased food intake.  - Experienced severe diarrhea yesterday.  - Advised to continue  taking pantoprazole and Cymbalta.  - Metformin will be discontinued permanently.    Patient has good understanding of upcoming surgery, expectations, treatment plan, diet modifications to maintain weight management            Follow Up     No follow-ups on file.  Patient was given instructions and counseling regarding her condition or for health maintenance advice. Please see specific information pulled into the AVS if appropriate.    Patient or patient representative verbalized consent for the use of Ambient Listening during the visit with  MATEO Condon for chart documentation. 7/15/2025  06:51 EDT

## 2025-07-10 LAB
ALBUMIN SERPL-MCNC: 4 G/DL (ref 3.5–5.2)
ALBUMIN/GLOB SERPL: 1.3 G/DL
ALP SERPL-CCNC: 69 U/L (ref 39–117)
ALT SERPL-CCNC: 18 U/L (ref 1–33)
AST SERPL-CCNC: 15 U/L (ref 1–32)
BILIRUB SERPL-MCNC: 0.4 MG/DL (ref 0–1.2)
BUN SERPL-MCNC: 12 MG/DL (ref 6–20)
BUN/CREAT SERPL: 14 (ref 7–25)
CALCIUM SERPL-MCNC: 9.6 MG/DL (ref 8.6–10.5)
CHLORIDE SERPL-SCNC: 106 MMOL/L (ref 98–107)
CO2 SERPL-SCNC: 22 MMOL/L (ref 22–29)
CREAT SERPL-MCNC: 0.86 MG/DL (ref 0.57–1)
EGFRCR SERPLBLD CKD-EPI 2021: 85.6 ML/MIN/1.73
ERYTHROCYTE [DISTWIDTH] IN BLOOD BY AUTOMATED COUNT: 16.3 % (ref 12.3–15.4)
GLOBULIN SER CALC-MCNC: 3 GM/DL
GLUCOSE SERPL-MCNC: 92 MG/DL (ref 65–99)
HBA1C MFR BLD: 5.7 % (ref 4.8–5.6)
HCT VFR BLD AUTO: 42 % (ref 34–46.6)
HGB BLD-MCNC: 13.6 G/DL (ref 12–15.9)
MCH RBC QN AUTO: 26.9 PG (ref 26.6–33)
MCHC RBC AUTO-ENTMCNC: 32.4 G/DL (ref 31.5–35.7)
MCV RBC AUTO: 83 FL (ref 79–97)
PLATELET # BLD AUTO: 372 10*3/MM3 (ref 140–450)
POTASSIUM SERPL-SCNC: 4.4 MMOL/L (ref 3.5–5.2)
PROT SERPL-MCNC: 7 G/DL (ref 6–8.5)
RBC # BLD AUTO: 5.06 10*6/MM3 (ref 3.77–5.28)
SODIUM SERPL-SCNC: 139 MMOL/L (ref 136–145)
TSH SERPL DL<=0.005 MIU/L-ACNC: 1.49 UIU/ML (ref 0.27–4.2)
WBC # BLD AUTO: 5.85 10*3/MM3 (ref 3.4–10.8)

## 2025-07-16 ENCOUNTER — HOSPITAL ENCOUNTER (OUTPATIENT)
Dept: CARDIOLOGY | Facility: HOSPITAL | Age: 45
Discharge: HOME | End: 2025-07-16
Admitting: NURSE PRACTITIONER
Payer: COMMERCIAL

## 2025-07-16 VITALS
DIASTOLIC BLOOD PRESSURE: 86 MMHG | HEIGHT: 65 IN | WEIGHT: 293 LBS | BODY MASS INDEX: 48.82 KG/M2 | SYSTOLIC BLOOD PRESSURE: 148 MMHG

## 2025-07-16 DIAGNOSIS — Z01.818 PREOPERATIVE CLEARANCE: ICD-10-CM

## 2025-07-16 LAB
AORTIC ARCH: 1.9 CM
AORTIC DIMENSIONLESS INDEX: 0.86 (DI)
ASCENDING AORTA: 3.1 CM
AV MEAN PRESS GRAD SYS DOP V1V2: 5.5 MMHG
AV VMAX SYS DOP: 161.4 CM/SEC
BH CV ECHO MEAS - ACS: 1.85 CM
BH CV ECHO MEAS - AO MAX PG: 10.4 MMHG
BH CV ECHO MEAS - AO ROOT DIAM: 3.2 CM
BH CV ECHO MEAS - AO V2 VTI: 37.2 CM
BH CV ECHO MEAS - AVA(I,D): 2.6 CM2
BH CV ECHO MEAS - EDV(CUBED): 84.7 ML
BH CV ECHO MEAS - EDV(MOD-SP2): 204 ML
BH CV ECHO MEAS - EDV(MOD-SP4): 204 ML
BH CV ECHO MEAS - EF(MOD-SP2): 59.8 %
BH CV ECHO MEAS - EF(MOD-SP4): 56.9 %
BH CV ECHO MEAS - ESV(CUBED): 19.3 ML
BH CV ECHO MEAS - ESV(MOD-SP2): 82 ML
BH CV ECHO MEAS - ESV(MOD-SP4): 88 ML
BH CV ECHO MEAS - FS: 38.9 %
BH CV ECHO MEAS - IVS/LVPW: 0.96 CM
BH CV ECHO MEAS - IVSD: 0.9 CM
BH CV ECHO MEAS - LAT PEAK E' VEL: 10 CM/SEC
BH CV ECHO MEAS - LV DIASTOLIC VOL/BSA (35-75): 85.6 CM2
BH CV ECHO MEAS - LV MASS(C)D: 131.1 GRAMS
BH CV ECHO MEAS - LV MAX PG: 7.5 MMHG
BH CV ECHO MEAS - LV MEAN PG: 4.3 MMHG
BH CV ECHO MEAS - LV SYSTOLIC VOL/BSA (12-30): 36.9 CM2
BH CV ECHO MEAS - LV V1 MAX: 137.1 CM/SEC
BH CV ECHO MEAS - LV V1 VTI: 32.1 CM
BH CV ECHO MEAS - LVIDD: 4.4 CM
BH CV ECHO MEAS - LVIDS: 2.7 CM
BH CV ECHO MEAS - LVOT AREA: 3 CM2
BH CV ECHO MEAS - LVOT DIAM: 1.96 CM
BH CV ECHO MEAS - LVPWD: 0.94 CM
BH CV ECHO MEAS - MED PEAK E' VEL: 7.8 CM/SEC
BH CV ECHO MEAS - MV A DUR: 0.13 SEC
BH CV ECHO MEAS - MV A MAX VEL: 100.6 CM/SEC
BH CV ECHO MEAS - MV DEC SLOPE: 317.4 CM/SEC2
BH CV ECHO MEAS - MV DEC TIME: 0.24 SEC
BH CV ECHO MEAS - MV E MAX VEL: 92.4 CM/SEC
BH CV ECHO MEAS - MV E/A: 0.92
BH CV ECHO MEAS - MV MAX PG: 5.3 MMHG
BH CV ECHO MEAS - MV MEAN PG: 2.22 MMHG
BH CV ECHO MEAS - MV P1/2T: 107.7 MSEC
BH CV ECHO MEAS - MV V2 VTI: 39.8 CM
BH CV ECHO MEAS - MVA(P1/2T): 2.04 CM2
BH CV ECHO MEAS - MVA(VTI): 2.45 CM2
BH CV ECHO MEAS - PA ACC TIME: 0.2 SEC
BH CV ECHO MEAS - PA V2 MAX: 119.4 CM/SEC
BH CV ECHO MEAS - PULM A REVS DUR: 0.12 SEC
BH CV ECHO MEAS - PULM A REVS VEL: 31.8 CM/SEC
BH CV ECHO MEAS - PULM DIAS VEL: 45.3 CM/SEC
BH CV ECHO MEAS - PULM S/D: 1.3
BH CV ECHO MEAS - PULM SYS VEL: 58.9 CM/SEC
BH CV ECHO MEAS - QP/QS: 0.64
BH CV ECHO MEAS - RAP SYSTOLE: 3 MMHG
BH CV ECHO MEAS - RV MAX PG: 2.41 MMHG
BH CV ECHO MEAS - RV V1 MAX: 77.7 CM/SEC
BH CV ECHO MEAS - RV V1 VTI: 19.7 CM
BH CV ECHO MEAS - RVOT DIAM: 2 CM
BH CV ECHO MEAS - SUP REN AO DIAM: 1.6 CM
BH CV ECHO MEAS - SV(LVOT): 97.2 ML
BH CV ECHO MEAS - SV(MOD-SP2): 122 ML
BH CV ECHO MEAS - SV(MOD-SP4): 116 ML
BH CV ECHO MEAS - SV(RVOT): 62.2 ML
BH CV ECHO MEAS - SVI(LVOT): 40.8 ML/M2
BH CV ECHO MEAS - SVI(MOD-SP2): 51.2 ML/M2
BH CV ECHO MEAS - SVI(MOD-SP4): 48.7 ML/M2
BH CV ECHO MEAS - TAPSE (>1.6): 3.3 CM
BH CV ECHO MEASUREMENTS AVERAGE E/E' RATIO: 10.38
BH CV XLRA - RV BASE: 3.1 CM
BH CV XLRA - RV LENGTH: 8.5 CM
BH CV XLRA - TDI S': 17.1 CM/SEC
LEFT ATRIUM VOLUME INDEX: 20.9 ML/M2
LV EF BIPLANE MOD: 57 %
SINUS: 3 CM
STJ: 2.5 CM

## 2025-07-16 PROCEDURE — 93306 TTE W/DOPPLER COMPLETE: CPT

## 2025-08-21 ENCOUNTER — READMISSION MANAGEMENT (OUTPATIENT)
Dept: CALL CENTER | Facility: HOSPITAL | Age: 45
End: 2025-08-21
Payer: COMMERCIAL

## 2025-08-22 ENCOUNTER — TRANSITIONAL CARE MANAGEMENT TELEPHONE ENCOUNTER (OUTPATIENT)
Dept: CALL CENTER | Facility: HOSPITAL | Age: 45
End: 2025-08-22
Payer: COMMERCIAL